# Patient Record
Sex: MALE | Race: WHITE | NOT HISPANIC OR LATINO | Employment: FULL TIME | URBAN - METROPOLITAN AREA
[De-identification: names, ages, dates, MRNs, and addresses within clinical notes are randomized per-mention and may not be internally consistent; named-entity substitution may affect disease eponyms.]

---

## 2019-09-26 ENCOUNTER — OFFICE VISIT (OUTPATIENT)
Dept: FAMILY MEDICINE CLINIC | Facility: CLINIC | Age: 44
End: 2019-09-26

## 2019-09-26 VITALS
WEIGHT: 244.4 LBS | HEART RATE: 90 BPM | DIASTOLIC BLOOD PRESSURE: 82 MMHG | SYSTOLIC BLOOD PRESSURE: 118 MMHG | OXYGEN SATURATION: 98 % | TEMPERATURE: 98 F | BODY MASS INDEX: 33.1 KG/M2 | HEIGHT: 72 IN

## 2019-09-26 DIAGNOSIS — E66.9 OBESITY (BMI 30-39.9): ICD-10-CM

## 2019-09-26 DIAGNOSIS — E03.9 HYPOTHYROIDISM (ACQUIRED): ICD-10-CM

## 2019-09-26 DIAGNOSIS — E29.1 HYPOGONADISM IN MALE: ICD-10-CM

## 2019-09-26 DIAGNOSIS — Z79.899 HIGH RISK MEDICATION USE: Primary | ICD-10-CM

## 2019-09-26 PROCEDURE — 99213 OFFICE O/P EST LOW 20 MIN: CPT | Performed by: FAMILY MEDICINE

## 2019-09-26 RX ORDER — PHENTERMINE HYDROCHLORIDE 37.5 MG/1
1 TABLET ORAL DAILY
Refills: 0 | COMMUNITY
Start: 2019-08-23 | End: 2019-09-26 | Stop reason: SDUPTHER

## 2019-09-26 RX ORDER — LEVOTHYROXINE SODIUM 0.12 MG/1
1 TABLET ORAL DAILY
Refills: 1 | COMMUNITY
Start: 2019-07-19 | End: 2019-09-26 | Stop reason: SDUPTHER

## 2019-09-26 RX ORDER — PHENTERMINE HYDROCHLORIDE 37.5 MG/1
37.5 TABLET ORAL DAILY
Qty: 30 TABLET | Refills: 0 | Status: SHIPPED | OUTPATIENT
Start: 2019-09-26 | End: 2019-11-21 | Stop reason: SDUPTHER

## 2019-09-26 RX ORDER — LEVOTHYROXINE SODIUM 0.12 MG/1
125 TABLET ORAL DAILY
Qty: 90 TABLET | Refills: 1 | Status: SHIPPED | OUTPATIENT
Start: 2019-09-26 | End: 2020-07-17 | Stop reason: SDUPTHER

## 2019-09-26 NOTE — PROGRESS NOTES
"  Chief Complaint   Patient presents with   • Hypothyroidism     Follow up        Subjective   Omkar Faustin is a 44 y.o. male.     History of Present Illness   PT is here for getting reestablished and needs refills and   Hypothyroidism- Stable and some  weight change.  Denies heat or cold intolerance.  No palpitations. He has been out of meds for about 3 weeks.  He feels more lethargic bc he is out of meds.    Hypogonadism- needs to get levels done before we can start.  The following portions of the patient's history were reviewed and updated as appropriate: allergies, current medications, past family history, past medical history, past social history, past surgical history and problem list.    Review of Systems   Constitutional: Negative for fatigue.   Eyes: Negative for blurred vision.   Respiratory: Negative for cough, chest tightness and shortness of breath.    Cardiovascular: Negative for chest pain, palpitations and leg swelling.   Neurological: Negative for dizziness, light-headedness and headache.       There is no problem list on file for this patient.      No Known Allergies      Current Outpatient Medications:   •  levothyroxine (SYNTHROID, LEVOTHROID) 125 MCG tablet, Take 1 tablet by mouth Daily., Disp: 90 tablet, Rfl: 1  •  phentermine (ADIPEX-P) 37.5 MG tablet, Take 1 tablet by mouth Daily., Disp: 30 tablet, Rfl: 0    No past medical history on file.    No past surgical history on file.    No family history on file.    Social History     Tobacco Use   • Smoking status: Never Smoker   • Smokeless tobacco: Never Used   Substance Use Topics   • Alcohol use: Yes     Frequency: Monthly or less            Objective     Visit Vitals  /82   Pulse 90   Temp 98 °F (36.7 °C) (Temporal)   Ht 182.9 cm (72\")   Wt 111 kg (244 lb 6.4 oz)   SpO2 98%   BMI 33.15 kg/m²       Physical Exam   Constitutional: He appears well-developed. No distress.   Eyes: Conjunctivae and lids are normal.   Neck: Trachea normal. " Carotid bruit is not present. No thyroid mass and no thyromegaly present.   Cardiovascular: Normal rate, regular rhythm and normal heart sounds.   Pulmonary/Chest: Effort normal and breath sounds normal.   Lymphadenopathy:     He has no cervical adenopathy.   Neurological: He is alert. He is not disoriented.   Skin: Skin is warm and dry.   Psychiatric: He has a normal mood and affect. His speech is normal and behavior is normal. He is attentive.   Nursing note and vitals reviewed.          Procedures    Assessment/Plan   Problems Addressed this Visit     None      Visit Diagnoses     High risk medication use    -  Primary    Relevant Orders    Compliance Drug Analysis, Ur - Urine, Clean Catch    Hypothyroidism (acquired)        Relevant Medications    levothyroxine (SYNTHROID, LEVOTHROID) 125 MCG tablet    Obesity (BMI 30-39.9)        Relevant Medications    phentermine (ADIPEX-P) 37.5 MG tablet    Hypogonadism in male        Relevant Orders    Testosterone (Free & Total), LC / MS          Orders Placed This Encounter   Procedures   • Compliance Drug Analysis, Ur - Urine, Clean Catch   • Testosterone (Free & Total), LC / MS       Current Outpatient Medications   Medication Sig Dispense Refill   • levothyroxine (SYNTHROID, LEVOTHROID) 125 MCG tablet Take 1 tablet by mouth Daily. 90 tablet 1   • phentermine (ADIPEX-P) 37.5 MG tablet Take 1 tablet by mouth Daily. 30 tablet 0     No current facility-administered medications for this visit.        DIMITRI RUN  and reviewed.  Risks of the medication include but are not limited to fatigue, somnolence, increased risk of falls, constipation, allergic reaction, dependence, and addiction.  Also, emphasized not taking any illicit substances.  Patient is aware and acknowledges information given.     Drug screen done today and Controlled Substance agreement signed.  Refills and encourage compliance.  Will get labs next visit.  No Follow-up on file.    There are no Patient  Instructions on file for this visit.

## 2019-09-30 LAB
TESTOST FREE SERPL-MCNC: 8.2 PG/ML (ref 6.8–21.5)
TESTOST SERPL-MCNC: 321.7 NG/DL (ref 264–916)

## 2019-10-04 LAB — DRUGS UR: NORMAL

## 2019-10-22 DIAGNOSIS — Z23 IMMUNIZATION DUE: Primary | ICD-10-CM

## 2019-10-23 DIAGNOSIS — J40 BRONCHITIS: Primary | ICD-10-CM

## 2019-10-23 RX ORDER — AZITHROMYCIN 250 MG/1
TABLET, FILM COATED ORAL
Qty: 6 TABLET | Refills: 0 | Status: SHIPPED | OUTPATIENT
Start: 2019-10-23 | End: 2019-11-20 | Stop reason: SDUPTHER

## 2019-11-20 DIAGNOSIS — J40 BRONCHITIS: ICD-10-CM

## 2019-11-20 RX ORDER — AZITHROMYCIN 250 MG/1
TABLET, FILM COATED ORAL
Qty: 6 TABLET | Refills: 0 | Status: SHIPPED | OUTPATIENT
Start: 2019-11-20

## 2019-11-21 DIAGNOSIS — E66.9 OBESITY (BMI 30-39.9): ICD-10-CM

## 2019-11-21 RX ORDER — PHENTERMINE HYDROCHLORIDE 37.5 MG/1
37.5 TABLET ORAL DAILY
Qty: 30 TABLET | Refills: 0 | Status: SHIPPED | OUTPATIENT
Start: 2019-11-21 | End: 2020-02-13 | Stop reason: SDUPTHER

## 2019-12-22 RX ORDER — CYCLOBENZAPRINE HCL 10 MG
10 TABLET ORAL 3 TIMES DAILY PRN
Qty: 30 TABLET | Refills: 0 | Status: SHIPPED | OUTPATIENT
Start: 2019-12-22

## 2020-01-02 ENCOUNTER — CLINICAL SUPPORT (OUTPATIENT)
Dept: FAMILY MEDICINE CLINIC | Facility: CLINIC | Age: 45
End: 2020-01-02

## 2020-01-02 DIAGNOSIS — Z23 NEED FOR INFLUENZA VACCINATION: Primary | ICD-10-CM

## 2020-01-02 PROCEDURE — 90715 TDAP VACCINE 7 YRS/> IM: CPT | Performed by: FAMILY MEDICINE

## 2020-01-02 PROCEDURE — 90471 IMMUNIZATION ADMIN: CPT | Performed by: FAMILY MEDICINE

## 2020-01-02 PROCEDURE — 90686 IIV4 VACC NO PRSV 0.5 ML IM: CPT | Performed by: FAMILY MEDICINE

## 2020-01-02 PROCEDURE — 90472 IMMUNIZATION ADMIN EACH ADD: CPT | Performed by: FAMILY MEDICINE

## 2020-02-10 DIAGNOSIS — E66.9 OBESITY (BMI 30-39.9): ICD-10-CM

## 2020-02-10 RX ORDER — PHENTERMINE HYDROCHLORIDE 37.5 MG/1
37.5 TABLET ORAL DAILY
Qty: 30 TABLET | OUTPATIENT
Start: 2020-02-10

## 2020-02-13 ENCOUNTER — OFFICE VISIT (OUTPATIENT)
Dept: FAMILY MEDICINE CLINIC | Facility: CLINIC | Age: 45
End: 2020-02-13

## 2020-02-13 VITALS
WEIGHT: 248 LBS | HEIGHT: 72 IN | OXYGEN SATURATION: 98 % | SYSTOLIC BLOOD PRESSURE: 110 MMHG | BODY MASS INDEX: 33.59 KG/M2 | HEART RATE: 94 BPM | DIASTOLIC BLOOD PRESSURE: 78 MMHG | TEMPERATURE: 97.1 F

## 2020-02-13 DIAGNOSIS — E03.9 HYPOTHYROIDISM (ACQUIRED): Primary | ICD-10-CM

## 2020-02-13 DIAGNOSIS — E66.9 OBESITY (BMI 30-39.9): ICD-10-CM

## 2020-02-13 DIAGNOSIS — E66.3 OVERWEIGHT: ICD-10-CM

## 2020-02-13 PROCEDURE — 99213 OFFICE O/P EST LOW 20 MIN: CPT | Performed by: FAMILY MEDICINE

## 2020-02-13 RX ORDER — PHENTERMINE HYDROCHLORIDE 37.5 MG/1
37.5 TABLET ORAL DAILY
Qty: 30 TABLET | Refills: 0 | Status: SHIPPED | OUTPATIENT
Start: 2020-02-13 | End: 2020-07-16 | Stop reason: SDUPTHER

## 2020-02-13 NOTE — PROGRESS NOTES
Chief Complaint   Patient presents with   • Med Refill     Pt is here to follow up and get a refill on his weight loss medication        Subjective   Omkar Faustin is a 44 y.o. male.     History of Present Illness   PT is here for refills and   Overweight- needs refills and is trying to exercise and diet  Hypothyroidism- Stable and No significant weight change.  Denies heat or cold intolerance.  No palpitations.    The following portions of the patient's history were reviewed and updated as appropriate: allergies, current medications, past family history, past medical history, past social history, past surgical history and problem list.    Review of Systems   Constitutional: Negative for fatigue.   Eyes: Negative for blurred vision.   Respiratory: Negative for cough, chest tightness and shortness of breath.    Cardiovascular: Negative for chest pain, palpitations and leg swelling.   Neurological: Negative for dizziness, light-headedness and headache.       Patient Active Problem List   Diagnosis   • Hypothyroidism (acquired)   • Overweight       No Known Allergies      Current Outpatient Medications:   •  levothyroxine (SYNTHROID, LEVOTHROID) 125 MCG tablet, Take 1 tablet by mouth Daily., Disp: 90 tablet, Rfl: 1  •  phentermine (ADIPEX-P) 37.5 MG tablet, Take 1 tablet by mouth Daily., Disp: 30 tablet, Rfl: 0  •  azithromycin (ZITHROMAX) 250 MG tablet, Take 2 tablets the first day, then 1 tablet daily for 4 days., Disp: 6 tablet, Rfl: 0  •  cyclobenzaprine (FLEXERIL) 10 MG tablet, Take 1 tablet by mouth 3 (Three) Times a Day As Needed for Muscle Spasms., Disp: 30 tablet, Rfl: 0    Past Medical History:   Diagnosis Date   • ADHD    • Eczema    • Hypothyroidism (acquired)    • Overweight        History reviewed. No pertinent surgical history.    History reviewed. No pertinent family history.    Social History     Tobacco Use   • Smoking status: Never Smoker   • Smokeless tobacco: Never Used   Substance Use Topics   •  "Alcohol use: Yes     Frequency: Monthly or less            Objective     Visit Vitals  /78   Pulse 94   Temp 97.1 °F (36.2 °C)   Ht 182.9 cm (72\")   Wt 112 kg (248 lb)   SpO2 98%   BMI 33.63 kg/m²       Physical Exam   Constitutional: He appears well-developed. No distress.   HENT:   Head: Normocephalic.   Right Ear: External ear normal.   Left Ear: External ear normal.   Mouth/Throat: Oropharynx is clear and moist. No oropharyngeal exudate.   Eyes: Conjunctivae and lids are normal. Right eye exhibits no discharge. Left eye exhibits no discharge.   Neck: Trachea normal. Carotid bruit is not present. No tracheal deviation present. No thyroid mass and no thyromegaly present.   Cardiovascular: Normal rate, regular rhythm and normal heart sounds.   Pulmonary/Chest: Effort normal and breath sounds normal. No stridor. No respiratory distress. He has no wheezes. He has no rales.   Lymphadenopathy:     He has no cervical adenopathy.   Neurological: He is alert. He is not disoriented.   Skin: Skin is warm and dry.   Psychiatric: He has a normal mood and affect. His speech is normal and behavior is normal. He is attentive.   Nursing note and vitals reviewed.      No results found for: GLUCOSE, BUN, CREATININE, EGFRIFNONA, EGFRIFAFRI, BCR, K, CO2, CALCIUM, PROTENTOTREF, ALBUMIN, LABIL2, BILIRUBIN, AST, ALT    No results found for: WBC, RBC, HGB, HCT, MCV, MCH, MCHC, RDW, RDWSD, MPV, PLT, NEUTRORELPCT, LYMPHORELPCT, MONORELPCT, EOSRELPCT, BASORELPCT, AUTOIGPER, NEUTROABS, LYMPHSABS, MONOSABS, EOSABS, BASOSABS, AUTOIGNUM, NRBC    No results found for: HGBA1C    No results found for: [id number]    No results found for: TSH    No results found for: CHOL  No results found for: TRIG  No results found for: HDL  No results found for: LDL  No results found for: VLDL  No results found for: LDLHDL      Procedures    Assessment/Plan   Problems Addressed this Visit        Endocrine    Hypothyroidism (acquired) - Primary       Other "    Overweight      Other Visit Diagnoses     Obesity (BMI 30-39.9)        Relevant Medications    phentermine (ADIPEX-P) 37.5 MG tablet          No orders of the defined types were placed in this encounter.      Current Outpatient Medications   Medication Sig Dispense Refill   • levothyroxine (SYNTHROID, LEVOTHROID) 125 MCG tablet Take 1 tablet by mouth Daily. 90 tablet 1   • phentermine (ADIPEX-P) 37.5 MG tablet Take 1 tablet by mouth Daily. 30 tablet 0   • azithromycin (ZITHROMAX) 250 MG tablet Take 2 tablets the first day, then 1 tablet daily for 4 days. 6 tablet 0   • cyclobenzaprine (FLEXERIL) 10 MG tablet Take 1 tablet by mouth 3 (Three) Times a Day As Needed for Muscle Spasms. 30 tablet 0     No current facility-administered medications for this visit.        No follow-ups on file.    There are no Patient Instructions on file for this visit.

## 2020-05-19 DIAGNOSIS — E66.9 OBESITY (BMI 30-39.9): ICD-10-CM

## 2020-05-21 RX ORDER — PHENTERMINE HYDROCHLORIDE 37.5 MG/1
37.5 TABLET ORAL DAILY
Qty: 30 TABLET | OUTPATIENT
Start: 2020-05-21

## 2020-07-16 ENCOUNTER — OFFICE VISIT (OUTPATIENT)
Dept: FAMILY MEDICINE CLINIC | Facility: CLINIC | Age: 45
End: 2020-07-16

## 2020-07-16 VITALS
HEART RATE: 77 BPM | BODY MASS INDEX: 32.72 KG/M2 | HEIGHT: 72 IN | OXYGEN SATURATION: 98 % | TEMPERATURE: 98.2 F | WEIGHT: 241.6 LBS | SYSTOLIC BLOOD PRESSURE: 118 MMHG | DIASTOLIC BLOOD PRESSURE: 82 MMHG

## 2020-07-16 DIAGNOSIS — E03.9 HYPOTHYROIDISM (ACQUIRED): Primary | ICD-10-CM

## 2020-07-16 DIAGNOSIS — E66.9 OBESITY (BMI 30-39.9): ICD-10-CM

## 2020-07-16 DIAGNOSIS — E66.3 OVERWEIGHT: ICD-10-CM

## 2020-07-16 PROCEDURE — 99213 OFFICE O/P EST LOW 20 MIN: CPT | Performed by: FAMILY MEDICINE

## 2020-07-16 RX ORDER — PHENTERMINE HYDROCHLORIDE 37.5 MG/1
37.5 TABLET ORAL DAILY
Qty: 30 TABLET | Refills: 0 | Status: SHIPPED | OUTPATIENT
Start: 2020-07-16 | End: 2021-08-31 | Stop reason: SDUPTHER

## 2020-07-16 NOTE — PROGRESS NOTES
Chief Complaint   Patient presents with   • Hypothyroidism     Pt is here for follow up on thyroid levels    • Med Refill     Pt is needing a refill on weightloss medication and thyroid medication according to levels        Subjective   Omkar Faustin is a 45 y.o. male.     History of Present Illness   PT is here for refills, labs and  Hypothyroidism- Stable and No significant weight change.  Denies heat or cold intolerance.  No palpitations. He has not taken in few weeks.  Obesity- stable and doing well with exercise and diet.  Needs refills.  The following portions of the patient's history were reviewed and updated as appropriate: allergies, current medications, past family history, past medical history, past social history, past surgical history and problem list.    Review of Systems   Constitutional: Negative for fatigue.   Eyes: Negative for blurred vision.   Respiratory: Negative for cough, chest tightness and shortness of breath.    Cardiovascular: Negative for chest pain, palpitations and leg swelling.   Neurological: Negative for dizziness, light-headedness and headache.       Patient Active Problem List   Diagnosis   • Hypothyroidism (acquired)   • Overweight       No Known Allergies      Current Outpatient Medications:   •  levothyroxine (SYNTHROID, LEVOTHROID) 125 MCG tablet, Take 1 tablet by mouth Daily., Disp: 90 tablet, Rfl: 1  •  phentermine (ADIPEX-P) 37.5 MG tablet, Take 1 tablet by mouth Daily., Disp: 30 tablet, Rfl: 0  •  azithromycin (ZITHROMAX) 250 MG tablet, Take 2 tablets the first day, then 1 tablet daily for 4 days., Disp: 6 tablet, Rfl: 0  •  cyclobenzaprine (FLEXERIL) 10 MG tablet, Take 1 tablet by mouth 3 (Three) Times a Day As Needed for Muscle Spasms., Disp: 30 tablet, Rfl: 0    Past Medical History:   Diagnosis Date   • ADHD    • Eczema    • Hypothyroidism (acquired)    • Overweight        History reviewed. No pertinent surgical history.    History reviewed. No pertinent family  "history.    Social History     Tobacco Use   • Smoking status: Never Smoker   • Smokeless tobacco: Never Used   Substance Use Topics   • Alcohol use: Yes     Frequency: Monthly or less            Objective     Visit Vitals  /82   Pulse 77   Temp 98.2 °F (36.8 °C)   Ht 182.9 cm (72\")   Wt 110 kg (241 lb 9.6 oz)   SpO2 98%   BMI 32.77 kg/m²       Physical Exam   Constitutional: He appears well-developed. No distress.   Eyes: Conjunctivae and lids are normal.   Neck: Trachea normal. Carotid bruit is not present. No thyroid mass and no thyromegaly present.   Cardiovascular: Normal rate, regular rhythm and normal heart sounds. Exam reveals no friction rub.   No murmur heard.  Pulmonary/Chest: Effort normal and breath sounds normal. No stridor. No respiratory distress. He has no wheezes.   Lymphadenopathy:     He has no cervical adenopathy.   Neurological: He is alert. He is not disoriented.   Skin: Skin is warm and dry.   Psychiatric: He has a normal mood and affect. His speech is normal and behavior is normal. He is attentive.   Nursing note and vitals reviewed.      No results found for: GLUCOSE, BUN, CREATININE, EGFRIFNONA, EGFRIFAFRI, BCR, K, CO2, CALCIUM, PROTENTOTREF, ALBUMIN, LABIL2, BILIRUBIN, AST, ALT    No results found for: WBC, RBC, HGB, HCT, MCV, MCH, MCHC, RDW, RDWSD, MPV, PLT, NEUTRORELPCT, LYMPHORELPCT, MONORELPCT, EOSRELPCT, BASORELPCT, AUTOIGPER, NEUTROABS, LYMPHSABS, MONOSABS, EOSABS, BASOSABS, AUTOIGNUM, NRBC    No results found for: HGBA1C    No results found for: [id number]    No results found for: TSH    No results found for: CHOL  No results found for: TRIG  No results found for: HDL  No results found for: LDL  No results found for: VLDL  No results found for: LDLHDL      Procedures    Assessment/Plan   Problems Addressed this Visit        Endocrine    Hypothyroidism (acquired) - Primary    Relevant Orders    TSH       Other    Overweight      Other Visit Diagnoses     Obesity (BMI 30-39.9)  "       Relevant Medications    phentermine (ADIPEX-P) 37.5 MG tablet          Orders Placed This Encounter   Procedures   • TSH       Current Outpatient Medications   Medication Sig Dispense Refill   • levothyroxine (SYNTHROID, LEVOTHROID) 125 MCG tablet Take 1 tablet by mouth Daily. 90 tablet 1   • phentermine (ADIPEX-P) 37.5 MG tablet Take 1 tablet by mouth Daily. 30 tablet 0   • azithromycin (ZITHROMAX) 250 MG tablet Take 2 tablets the first day, then 1 tablet daily for 4 days. 6 tablet 0   • cyclobenzaprine (FLEXERIL) 10 MG tablet Take 1 tablet by mouth 3 (Three) Times a Day As Needed for Muscle Spasms. 30 tablet 0     No current facility-administered medications for this visit.      Refills and   DIMITRI RUN  and reviewed.  Risks of the medication include but are not limited to fatigue, somnolence, increased risk of falls, constipation, allergic reaction, dependence, and addiction.  Also, emphasized not taking any illicit substances.  Patient is aware and acknowledges information given.    Refill thyroid medication after we get labs back.  Encourage compliance.  Return in about 3 months (around 10/16/2020) for hypothyroidism.    There are no Patient Instructions on file for this visit.

## 2020-07-17 DIAGNOSIS — E03.9 HYPOTHYROIDISM (ACQUIRED): ICD-10-CM

## 2020-07-17 LAB — TSH SERPL DL<=0.005 MIU/L-ACNC: 13.1 UIU/ML (ref 0.27–4.2)

## 2020-07-17 RX ORDER — LEVOTHYROXINE SODIUM 0.12 MG/1
125 TABLET ORAL DAILY
Qty: 90 TABLET | Refills: 1 | Status: SHIPPED | OUTPATIENT
Start: 2020-07-17 | End: 2021-08-16 | Stop reason: SDUPTHER

## 2020-12-18 ENCOUNTER — TELEPHONE (OUTPATIENT)
Dept: FAMILY MEDICINE CLINIC | Facility: CLINIC | Age: 45
End: 2020-12-18

## 2020-12-18 DIAGNOSIS — R42 DIZZINESS: Primary | ICD-10-CM

## 2020-12-18 RX ORDER — MECLIZINE HYDROCHLORIDE 25 MG/1
25 TABLET ORAL 3 TIMES DAILY PRN
Qty: 30 TABLET | Refills: 0 | Status: SHIPPED | OUTPATIENT
Start: 2020-12-18

## 2020-12-18 NOTE — TELEPHONE ENCOUNTER
Patients wife, Keerthi, states the patient has experienced dizziness since 8pm on 12/17/2020. She would like advice on what to do.    Please call Keerthi at [phone].

## 2020-12-18 NOTE — TELEPHONE ENCOUNTER
PT has been since last night having some dizziness feeling when he goes from laying down to standing position.  No dizziness with sitting or sudden turn of head.  He has no NVD.  He states otherwise he feels fine but did state that he has been drinking less fluids over past 2 days so I advised him to drink more fluids bc may be dehydrated.  I also sent in meclanzine and pt will give me an update in a couple of days.

## 2021-08-05 ENCOUNTER — OFFICE VISIT (OUTPATIENT)
Dept: FAMILY MEDICINE CLINIC | Facility: CLINIC | Age: 46
End: 2021-08-05

## 2021-08-05 VITALS
OXYGEN SATURATION: 97 % | HEART RATE: 82 BPM | TEMPERATURE: 97.8 F | DIASTOLIC BLOOD PRESSURE: 80 MMHG | SYSTOLIC BLOOD PRESSURE: 110 MMHG | BODY MASS INDEX: 34.35 KG/M2 | WEIGHT: 253.6 LBS | HEIGHT: 72 IN

## 2021-08-05 DIAGNOSIS — Z11.59 NEED FOR HEPATITIS C SCREENING TEST: ICD-10-CM

## 2021-08-05 DIAGNOSIS — M79.672 PAIN IN BOTH FEET: ICD-10-CM

## 2021-08-05 DIAGNOSIS — R53.83 OTHER FATIGUE: ICD-10-CM

## 2021-08-05 DIAGNOSIS — M79.671 PAIN IN BOTH FEET: ICD-10-CM

## 2021-08-05 DIAGNOSIS — E03.9 HYPOTHYROIDISM (ACQUIRED): Primary | ICD-10-CM

## 2021-08-05 PROCEDURE — 99214 OFFICE O/P EST MOD 30 MIN: CPT | Performed by: FAMILY MEDICINE

## 2021-08-05 NOTE — PROGRESS NOTES
"Chief Complaint  Pain (both feet for about 3 months ) and Thyroid Problem (labs and testosterone )    Subjective          Omkar Faustin presents to [facility]  History of Present Illness  PT is here for   Hypothyroidism- has been out of med for about a month.  He wants to get labs done bc last doctor told him he may have no thyroid issues.    He does feel fatigued even when on the med.  He is overall active but feels since he took covid vaccine he has felt fatigued and exhausted.  His feet hurt all the time for the past few months.    He easily gets cramps and does drink a lot of fluids  Objective   Vital Signs:   /80 (BP Location: Right arm, Patient Position: Sitting)   Pulse 82   Temp 97.8 °F (36.6 °C)   Ht 182.9 cm (72.01\")   Wt 115 kg (253 lb 9.6 oz)   SpO2 97%   BMI 34.39 kg/m²     Physical Exam  Constitutional:       Appearance: Normal appearance.   Cardiovascular:      Rate and Rhythm: Regular rhythm.      Heart sounds: Normal heart sounds.   Musculoskeletal:      Comments: Normal foot exam   Neurological:      Mental Status: He is alert.        Result Review :                 Assessment and Plan    Diagnoses and all orders for this visit:    1. Hypothyroidism (acquired) (Primary)  -     TSH Rfx On Abnormal To Free T4    2. Other fatigue  -     Comprehensive Metabolic Panel  -     Vitamin B12  -     TSH Rfx On Abnormal To Free T4  -     CBC & Differential  -     Testosterone (Free & Total), LC / MS    3. Need for hepatitis C screening test  -     Hepatitis C Antibody    4. Pain in both feet  -     Ambulatory Referral to Podiatry        Follow Up   No follow-ups on file.  Patient was given instructions and counseling regarding his condition or for health maintenance advice. Please see specific information pulled into the AVS if appropriate.     Will get labs  "

## 2021-08-10 DIAGNOSIS — R79.89 LOW TESTOSTERONE: Primary | ICD-10-CM

## 2021-08-10 LAB
ALBUMIN SERPL-MCNC: 4.6 G/DL (ref 3.5–5.2)
ALBUMIN/GLOB SERPL: 1.6 G/DL
ALP SERPL-CCNC: 67 U/L (ref 39–117)
ALT SERPL-CCNC: 33 U/L (ref 1–41)
AST SERPL-CCNC: 24 U/L (ref 1–40)
BASOPHILS # BLD AUTO: 0.05 10*3/MM3 (ref 0–0.2)
BASOPHILS NFR BLD AUTO: 0.7 % (ref 0–1.5)
BILIRUB SERPL-MCNC: 0.4 MG/DL (ref 0–1.2)
BUN SERPL-MCNC: 16 MG/DL (ref 6–20)
BUN/CREAT SERPL: 13.3 (ref 7–25)
CALCIUM SERPL-MCNC: 9.6 MG/DL (ref 8.6–10.5)
CHLORIDE SERPL-SCNC: 102 MMOL/L (ref 98–107)
CO2 SERPL-SCNC: 24.7 MMOL/L (ref 22–29)
CREAT SERPL-MCNC: 1.2 MG/DL (ref 0.76–1.27)
EOSINOPHIL # BLD AUTO: 0.19 10*3/MM3 (ref 0–0.4)
EOSINOPHIL NFR BLD AUTO: 2.6 % (ref 0.3–6.2)
ERYTHROCYTE [DISTWIDTH] IN BLOOD BY AUTOMATED COUNT: 13.4 % (ref 12.3–15.4)
GLOBULIN SER CALC-MCNC: 2.8 GM/DL
GLUCOSE SERPL-MCNC: 91 MG/DL (ref 65–99)
HCT VFR BLD AUTO: 42.9 % (ref 37.5–51)
HCV AB S/CO SERPL IA: <0.1 S/CO RATIO (ref 0–0.9)
HGB BLD-MCNC: 14.8 G/DL (ref 13–17.7)
IMM GRANULOCYTES # BLD AUTO: 0.04 10*3/MM3 (ref 0–0.05)
IMM GRANULOCYTES NFR BLD AUTO: 0.5 % (ref 0–0.5)
LYMPHOCYTES # BLD AUTO: 2.93 10*3/MM3 (ref 0.7–3.1)
LYMPHOCYTES NFR BLD AUTO: 39.6 % (ref 19.6–45.3)
MCH RBC QN AUTO: 30.5 PG (ref 26.6–33)
MCHC RBC AUTO-ENTMCNC: 34.5 G/DL (ref 31.5–35.7)
MCV RBC AUTO: 88.5 FL (ref 79–97)
MONOCYTES # BLD AUTO: 0.53 10*3/MM3 (ref 0.1–0.9)
MONOCYTES NFR BLD AUTO: 7.2 % (ref 5–12)
NEUTROPHILS # BLD AUTO: 3.66 10*3/MM3 (ref 1.7–7)
NEUTROPHILS NFR BLD AUTO: 49.4 % (ref 42.7–76)
NRBC BLD AUTO-RTO: 0 /100 WBC (ref 0–0.2)
PLATELET # BLD AUTO: 221 10*3/MM3 (ref 140–450)
POTASSIUM SERPL-SCNC: 4 MMOL/L (ref 3.5–5.2)
PROT SERPL-MCNC: 7.4 G/DL (ref 6–8.5)
RBC # BLD AUTO: 4.85 10*6/MM3 (ref 4.14–5.8)
SODIUM SERPL-SCNC: 142 MMOL/L (ref 136–145)
T4 FREE SERPL-MCNC: 0.7 NG/DL (ref 0.93–1.7)
TESTOST FREE SERPL-MCNC: 4.7 PG/ML (ref 6.8–21.5)
TESTOST SERPL-MCNC: 181.7 NG/DL (ref 264–916)
TSH SERPL DL<=0.005 MIU/L-ACNC: 31.3 UIU/ML (ref 0.27–4.2)
VIT B12 SERPL-MCNC: 528 PG/ML (ref 211–946)
WBC # BLD AUTO: 7.4 10*3/MM3 (ref 3.4–10.8)

## 2021-08-16 DIAGNOSIS — E03.9 HYPOTHYROIDISM (ACQUIRED): ICD-10-CM

## 2021-08-16 LAB
TESTOST FREE SERPL-MCNC: 7.5 PG/ML (ref 6.8–21.5)
TESTOST SERPL-MCNC: 305.5 NG/DL (ref 264–916)

## 2021-08-16 RX ORDER — LEVOTHYROXINE SODIUM 0.12 MG/1
125 TABLET ORAL DAILY
Qty: 90 TABLET | Refills: 1 | Status: SHIPPED | OUTPATIENT
Start: 2021-08-16

## 2021-08-18 DIAGNOSIS — R79.89 LOW TESTOSTERONE: Primary | ICD-10-CM

## 2021-08-31 DIAGNOSIS — E66.9 OBESITY (BMI 30-39.9): ICD-10-CM

## 2021-08-31 RX ORDER — PHENTERMINE HYDROCHLORIDE 37.5 MG/1
37.5 TABLET ORAL DAILY
Qty: 30 TABLET | Refills: 0 | Status: SHIPPED | OUTPATIENT
Start: 2021-08-31

## 2021-10-28 DIAGNOSIS — R79.89 LOW TESTOSTERONE: Primary | ICD-10-CM

## 2021-11-26 LAB
TESTOST FREE SERPL-MCNC: 7 PG/ML (ref 6.8–21.5)
TESTOST SERPL-MCNC: 248.6 NG/DL (ref 264–916)

## 2021-11-30 DIAGNOSIS — R79.89 LOW TESTOSTERONE IN MALE: Primary | ICD-10-CM

## 2021-11-30 RX ORDER — TESTOSTERONE CYPIONATE 100 MG/ML
100 INJECTION, SOLUTION INTRAMUSCULAR
Qty: 10 ML | Refills: 0 | Status: SHIPPED | OUTPATIENT
Start: 2021-11-30 | End: 2022-11-10 | Stop reason: SDUPTHER

## 2021-12-09 ENCOUNTER — TRANSCRIBE ORDERS (OUTPATIENT)
Dept: ADMINISTRATIVE | Facility: HOSPITAL | Age: 46
End: 2021-12-09

## 2021-12-09 DIAGNOSIS — U07.1 CLINICAL DIAGNOSIS OF SEVERE ACUTE RESPIRATORY SYNDROME CORONAVIRUS 2 (SARS-COV-2) DISEASE: Primary | ICD-10-CM

## 2021-12-10 ENCOUNTER — APPOINTMENT (OUTPATIENT)
Dept: INFUSION THERAPY | Facility: HOSPITAL | Age: 46
End: 2021-12-10

## 2022-10-27 ENCOUNTER — TELEPHONE (OUTPATIENT)
Dept: SURGERY | Facility: CLINIC | Age: 47
End: 2022-10-27

## 2022-11-10 DIAGNOSIS — E03.9 HYPOTHYROIDISM (ACQUIRED): Primary | ICD-10-CM

## 2022-11-10 DIAGNOSIS — R79.89 LOW TESTOSTERONE IN MALE: ICD-10-CM

## 2022-11-10 RX ORDER — TESTOSTERONE CYPIONATE 100 MG/ML
100 INJECTION, SOLUTION INTRAMUSCULAR
Qty: 10 ML | Refills: 0 | Status: SHIPPED | OUTPATIENT
Start: 2022-11-10 | End: 2022-11-10

## 2023-03-14 DIAGNOSIS — F90.0 ATTENTION DEFICIT HYPERACTIVITY DISORDER (ADHD), PREDOMINANTLY INATTENTIVE TYPE: Primary | ICD-10-CM

## 2023-04-24 ENCOUNTER — AMBULATORY SURGICAL CENTER (AMBULATORY)
Dept: URBAN - METROPOLITAN AREA SURGERY 17 | Facility: SURGERY | Age: 48
End: 2023-04-24
Payer: COMMERCIAL

## 2023-04-24 VITALS
DIASTOLIC BLOOD PRESSURE: 90 MMHG | DIASTOLIC BLOOD PRESSURE: 86 MMHG | HEART RATE: 88 BPM | TEMPERATURE: 97.3 F | SYSTOLIC BLOOD PRESSURE: 130 MMHG | RESPIRATION RATE: 16 BRPM | OXYGEN SATURATION: 91 % | RESPIRATION RATE: 6 BRPM | RESPIRATION RATE: 11 BRPM | SYSTOLIC BLOOD PRESSURE: 116 MMHG | SYSTOLIC BLOOD PRESSURE: 128 MMHG | OXYGEN SATURATION: 92 % | DIASTOLIC BLOOD PRESSURE: 75 MMHG | OXYGEN SATURATION: 98 % | SYSTOLIC BLOOD PRESSURE: 138 MMHG | RESPIRATION RATE: 10 BRPM | HEART RATE: 87 BPM | SYSTOLIC BLOOD PRESSURE: 140 MMHG | SYSTOLIC BLOOD PRESSURE: 120 MMHG | DIASTOLIC BLOOD PRESSURE: 73 MMHG | WEIGHT: 244 LBS | OXYGEN SATURATION: 97 % | TEMPERATURE: 98.4 F | RESPIRATION RATE: 14 BRPM | HEART RATE: 89 BPM | HEART RATE: 93 BPM | DIASTOLIC BLOOD PRESSURE: 72 MMHG | RESPIRATION RATE: 7 BRPM | HEART RATE: 91 BPM | HEART RATE: 79 BPM | SYSTOLIC BLOOD PRESSURE: 139 MMHG | OXYGEN SATURATION: 93 % | TEMPERATURE: 97.9 F

## 2023-04-24 DIAGNOSIS — Z12.11 ENCOUNTER FOR SCREENING FOR MALIGNANT NEOPLASM OF COLON: ICD-10-CM

## 2023-04-24 PROCEDURE — 45378 DIAGNOSTIC COLONOSCOPY: CPT | Mod: 33 | Performed by: INTERNAL MEDICINE

## 2023-05-08 NOTE — PATIENT INSTRUCTIONS
Plan of Care:    Medication changes: Okay to start Strattera/atomoxetine 40 mg capsules take 1 every morning for 1 week and then increase to 2 and continue until follow-up appointments  Monitor for dry mouth, upset stomach, worsening anxiety, worsening sleep, reports an 8 immediately, please read attached handout on new medication  Psychotherapy is not recommended at this time but remains a non-medication option if/when needed in the future.  Patient is agreeable to call the office with any worsening of symptoms or onset of intolerable side effects. Patient is agreeable to call 911 or go to the nearest ER should he/she begin having thoughts of hurting themself or other people.  Have prior records either dropped off or fax to my office to review if/when needed  Please read attached handout on ADHD

## 2023-05-09 ENCOUNTER — OFFICE VISIT (OUTPATIENT)
Dept: BEHAVIORAL HEALTH | Facility: CLINIC | Age: 48
End: 2023-05-09
Payer: COMMERCIAL

## 2023-05-09 VITALS
RESPIRATION RATE: 18 BRPM | SYSTOLIC BLOOD PRESSURE: 120 MMHG | OXYGEN SATURATION: 97 % | DIASTOLIC BLOOD PRESSURE: 62 MMHG | WEIGHT: 245 LBS | HEART RATE: 81 BPM | BODY MASS INDEX: 32.47 KG/M2 | HEIGHT: 73 IN

## 2023-05-09 DIAGNOSIS — F90.0 ADHD, PREDOMINANTLY INATTENTIVE TYPE: Primary | ICD-10-CM

## 2023-05-09 RX ORDER — ATOMOXETINE 40 MG/1
80 CAPSULE ORAL EVERY MORNING
Qty: 60 CAPSULE | Refills: 0 | Status: SHIPPED | OUTPATIENT
Start: 2023-05-09

## 2023-11-20 RX ORDER — AZITHROMYCIN 250 MG/1
TABLET, FILM COATED ORAL
Qty: 6 TABLET | Refills: 0 | Status: SHIPPED | OUTPATIENT
Start: 2023-11-20

## 2023-11-21 RX ORDER — BENZONATATE 200 MG/1
200 CAPSULE ORAL 3 TIMES DAILY PRN
Qty: 30 CAPSULE | Refills: 0 | Status: SHIPPED | OUTPATIENT
Start: 2023-11-21

## 2023-11-21 RX ORDER — PREDNISONE 5 MG/1
TABLET ORAL
Qty: 21 TABLET | Refills: 0 | Status: SHIPPED | OUTPATIENT
Start: 2023-11-21

## 2024-01-18 ENCOUNTER — OFFICE VISIT (OUTPATIENT)
Dept: FAMILY MEDICINE CLINIC | Facility: CLINIC | Age: 49
End: 2024-01-18
Payer: COMMERCIAL

## 2024-01-18 VITALS
HEART RATE: 92 BPM | RESPIRATION RATE: 18 BRPM | HEIGHT: 73 IN | BODY MASS INDEX: 33.83 KG/M2 | WEIGHT: 255.3 LBS | SYSTOLIC BLOOD PRESSURE: 118 MMHG | TEMPERATURE: 98.2 F | DIASTOLIC BLOOD PRESSURE: 76 MMHG | OXYGEN SATURATION: 97 %

## 2024-01-18 DIAGNOSIS — Z79.899 HIGH RISK MEDICATION USE: ICD-10-CM

## 2024-01-18 DIAGNOSIS — E03.9 HYPOTHYROIDISM (ACQUIRED): ICD-10-CM

## 2024-01-18 DIAGNOSIS — F90.0 ADHD, PREDOMINANTLY INATTENTIVE TYPE: Primary | ICD-10-CM

## 2024-01-18 PROCEDURE — 99214 OFFICE O/P EST MOD 30 MIN: CPT | Performed by: FAMILY MEDICINE

## 2024-01-18 RX ORDER — DEXTROAMPHETAMINE SACCHARATE, AMPHETAMINE ASPARTATE, DEXTROAMPHETAMINE SULFATE AND AMPHETAMINE SULFATE 7.5; 7.5; 7.5; 7.5 MG/1; MG/1; MG/1; MG/1
30 TABLET ORAL 2 TIMES DAILY
Qty: 60 TABLET | Refills: 0 | Status: SHIPPED | OUTPATIENT
Start: 2024-01-18

## 2024-01-18 NOTE — PROGRESS NOTES
"Chief Complaint  Hypothyroidism (Follow-up) and ADD (Evaluation )    Subjective        Omkar Faustin presents to [facility]  Hypothyroidism    ADD      Pt presents today for ADHD check up.  He used to be on 30 mg of adderall bid with last doctor due to ADHD.  He has been having trouble again recently focusing.  He wants to restart med.  No CP or H    Hypothyroidism- needs labs and check up on this.    Objective   Vital Signs:  /76 (BP Location: Left arm, Patient Position: Sitting, Cuff Size: Large Adult)   Pulse 92   Temp 98.2 °F (36.8 °C) (Tympanic)   Resp 18   Ht 185.4 cm (72.99\")   Wt 116 kg (255 lb 4.8 oz)   SpO2 97%   BMI 33.69 kg/m²   Estimated body mass index is 33.69 kg/m² as calculated from the following:    Height as of this encounter: 185.4 cm (72.99\").    Weight as of this encounter: 116 kg (255 lb 4.8 oz).               Physical Exam  Vitals and nursing note reviewed.   Constitutional:       Appearance: Normal appearance. He is well-developed.   Neck:      Thyroid: No thyroid mass or thyromegaly.      Trachea: Trachea normal. No tracheal tenderness or tracheal deviation.   Cardiovascular:      Rate and Rhythm: Normal rate and regular rhythm.      Heart sounds: Normal heart sounds. No murmur heard.  Pulmonary:      Effort: Pulmonary effort is normal. No respiratory distress.      Breath sounds: Normal breath sounds. No stridor. No wheezing or rhonchi.   Neurological:      General: No focal deficit present.      Mental Status: He is alert and oriented to person, place, and time. He is not disoriented.   Psychiatric:         Mood and Affect: Mood normal.         Behavior: Behavior normal.        Result Review :                     Assessment and Plan     Diagnoses and all orders for this visit:    1. ADHD, predominantly inattentive type (Primary)  -     amphetamine-dextroamphetamine (Adderall) 30 MG tablet; Take 1 tablet by mouth 2 (Two) Times a Day.  Dispense: " 60 tablet; Refill: 0    2. High risk medication use  -     Compliance Drug Analysis, Ur - Urine, Clean Catch    3. Hypothyroidism (acquired)  -     TSH Rfx On Abnormal To Free T4             Follow Up     Return in about 3 months (around 4/18/2024) for adhd.  Patient was given instructions and counseling regarding his condition or for health maintenance advice. Please see specific information pulled into the AVS if appropriate.         DIMITRI RUN  and reviewed on Epic.  Risks of the medication include but are not limited to fatigue, somnolence, increased risk of falls, constipation, allergic reaction, dependence, and addiction.  Also, emphasized not taking any illicit substances.  Patient is aware and acknowledges information given.  Patient is functioning well with the medication.  Patient denies somnolence or any other side effects with the medication.   Medication refilled.      Will get contract and drug screen done today and start adderall again.  SE discussed.

## 2024-01-26 LAB
DRUGS UR: NORMAL
TSH SERPL DL<=0.005 MIU/L-ACNC: 3.41 UIU/ML (ref 0.45–4.5)

## 2024-03-01 DIAGNOSIS — F90.0 ADHD, PREDOMINANTLY INATTENTIVE TYPE: ICD-10-CM

## 2024-03-01 RX ORDER — DEXTROAMPHETAMINE SACCHARATE, AMPHETAMINE ASPARTATE, DEXTROAMPHETAMINE SULFATE AND AMPHETAMINE SULFATE 7.5; 7.5; 7.5; 7.5 MG/1; MG/1; MG/1; MG/1
30 TABLET ORAL 2 TIMES DAILY
Qty: 60 TABLET | Refills: 0 | Status: SHIPPED | OUTPATIENT
Start: 2024-03-01

## 2024-04-15 DIAGNOSIS — F90.0 ADHD, PREDOMINANTLY INATTENTIVE TYPE: ICD-10-CM

## 2024-04-15 RX ORDER — DEXTROAMPHETAMINE SACCHARATE, AMPHETAMINE ASPARTATE, DEXTROAMPHETAMINE SULFATE AND AMPHETAMINE SULFATE 7.5; 7.5; 7.5; 7.5 MG/1; MG/1; MG/1; MG/1
30 TABLET ORAL 2 TIMES DAILY
Qty: 60 TABLET | Refills: 0 | Status: SHIPPED | OUTPATIENT
Start: 2024-04-15

## 2024-06-27 ENCOUNTER — OFFICE VISIT (OUTPATIENT)
Dept: FAMILY MEDICINE CLINIC | Facility: CLINIC | Age: 49
End: 2024-06-27
Payer: COMMERCIAL

## 2024-06-27 VITALS
HEART RATE: 92 BPM | OXYGEN SATURATION: 97 % | WEIGHT: 264 LBS | SYSTOLIC BLOOD PRESSURE: 144 MMHG | HEIGHT: 73 IN | RESPIRATION RATE: 18 BRPM | BODY MASS INDEX: 34.99 KG/M2 | DIASTOLIC BLOOD PRESSURE: 88 MMHG | TEMPERATURE: 98.6 F

## 2024-06-27 DIAGNOSIS — E78.1 HYPERTRIGLYCERIDEMIA: ICD-10-CM

## 2024-06-27 DIAGNOSIS — B35.3 TINEA PEDIS OF BOTH FEET: ICD-10-CM

## 2024-06-27 DIAGNOSIS — F90.0 ADHD, PREDOMINANTLY INATTENTIVE TYPE: Primary | ICD-10-CM

## 2024-06-27 DIAGNOSIS — Z82.49 FAMILY HISTORY OF HEART DISEASE: ICD-10-CM

## 2024-06-27 DIAGNOSIS — E03.9 HYPOTHYROIDISM (ACQUIRED): ICD-10-CM

## 2024-06-27 PROCEDURE — 99214 OFFICE O/P EST MOD 30 MIN: CPT | Performed by: FAMILY MEDICINE

## 2024-06-27 RX ORDER — DEXTROAMPHETAMINE SACCHARATE, AMPHETAMINE ASPARTATE, DEXTROAMPHETAMINE SULFATE AND AMPHETAMINE SULFATE 7.5; 7.5; 7.5; 7.5 MG/1; MG/1; MG/1; MG/1
30 TABLET ORAL 2 TIMES DAILY
Qty: 60 TABLET | Refills: 0 | Status: SHIPPED | OUTPATIENT
Start: 2024-06-27

## 2024-06-27 RX ORDER — KETOCONAZOLE 20 MG/G
1 CREAM TOPICAL DAILY
Qty: 30 G | Refills: 1 | Status: SHIPPED | OUTPATIENT
Start: 2024-06-27

## 2024-06-27 NOTE — PROGRESS NOTES
"Chief Complaint  ADD, Hypothyroidism, and Fatigue (Pt states he feels tired and sluggish lately. And would like some tests ran.Pt's has family hx of heart disease and is concerned he could have blocked arteries or somethinglike that going on )    Subjective        Omkar Faustin presents to [facility]  ADD  Associated symptoms include fatigue.   Hypothyroidism  Associated symptoms include fatigue.   Fatigue  Associated symptoms include fatigue.     Patient is here for ADHD checkup needing refills has no chest pains, no palpitations no change in appetite.  And patient is staying focused with medication.  Hypothyroidism- he gets thru opitmize you.    Hypertriglyceridemia- needs labs.  No med for this.    Pt has a family history of heart disease.  He wants to know about his heart health  Feet are itching him.    Objective   Vital Signs:  /88 (BP Location: Left arm, Patient Position: Sitting, Cuff Size: Adult)   Pulse 92   Temp 98.6 °F (37 °C) (Tympanic)   Resp 18   Ht 185.4 cm (72.99\")   Wt 120 kg (264 lb)   SpO2 97%   BMI 34.84 kg/m²   Estimated body mass index is 34.84 kg/m² as calculated from the following:    Height as of this encounter: 185.4 cm (72.99\").    Weight as of this encounter: 120 kg (264 lb).               Physical Exam  Vitals and nursing note reviewed.   Constitutional:       Appearance: Normal appearance. He is well-developed.   Cardiovascular:      Rate and Rhythm: Normal rate and regular rhythm.      Heart sounds: Normal heart sounds. No murmur heard.  Pulmonary:      Effort: Pulmonary effort is normal. No respiratory distress.      Breath sounds: Normal breath sounds. No stridor. No wheezing or rhonchi.   Skin:     Comments: Tenia pedis.    Neurological:      General: No focal deficit present.      Mental Status: He is alert and oriented to person, place, and time. He is not disoriented.   Psychiatric:         Mood and Affect: Mood normal.         " Behavior: Behavior normal.        Result Review :                     Assessment and Plan     Diagnoses and all orders for this visit:    1. ADHD, predominantly inattentive type (Primary)  -     amphetamine-dextroamphetamine (Adderall) 30 MG tablet; Take 1 tablet by mouth 2 (Two) Times a Day.  Dispense: 60 tablet; Refill: 0    2. Hypothyroidism (acquired)    3. Hypertriglyceridemia  -     Cancel: Lipid Panel  -     Cancel: Comprehensive Metabolic Panel  -     Comprehensive Metabolic Panel  -     Lipid Panel    4. Family history of heart disease  -     Ambulatory Referral to Cardiology    5. Tinea pedis of both feet  -     ketoconazole (NIZORAL) 2 % cream; Apply 1 Application topically to the appropriate area as directed Daily.  Dispense: 30 g; Refill: 1             Follow Up     Return in about 3 months (around 9/27/2024) for adhd.  Patient was given instructions and counseling regarding his condition or for health maintenance advice. Please see specific information pulled into the AVS if appropriate.     Refill, labs and work on diet and exercise.    Start cream for feet.

## 2024-06-28 LAB
ALBUMIN SERPL-MCNC: 4.5 G/DL (ref 3.5–5.2)
ALBUMIN/GLOB SERPL: 1.6 G/DL
ALP SERPL-CCNC: 62 U/L (ref 39–117)
ALT SERPL-CCNC: 27 U/L (ref 1–41)
AST SERPL-CCNC: 19 U/L (ref 1–40)
BILIRUB SERPL-MCNC: 0.6 MG/DL (ref 0–1.2)
BUN SERPL-MCNC: 10 MG/DL (ref 6–20)
BUN/CREAT SERPL: 8 (ref 7–25)
CALCIUM SERPL-MCNC: 9.6 MG/DL (ref 8.6–10.5)
CHLORIDE SERPL-SCNC: 104 MMOL/L (ref 98–107)
CHOLEST SERPL-MCNC: 152 MG/DL (ref 0–200)
CO2 SERPL-SCNC: 25.2 MMOL/L (ref 22–29)
CREAT SERPL-MCNC: 1.25 MG/DL (ref 0.76–1.27)
EGFRCR SERPLBLD CKD-EPI 2021: 69.7 ML/MIN/1.73
GLOBULIN SER CALC-MCNC: 2.9 GM/DL
GLUCOSE SERPL-MCNC: 122 MG/DL (ref 65–99)
HDLC SERPL-MCNC: 31 MG/DL (ref 40–60)
LDLC SERPL CALC-MCNC: 76 MG/DL (ref 0–100)
POTASSIUM SERPL-SCNC: 4 MMOL/L (ref 3.5–5.2)
PROT SERPL-MCNC: 7.4 G/DL (ref 6–8.5)
SODIUM SERPL-SCNC: 139 MMOL/L (ref 136–145)
TRIGL SERPL-MCNC: 274 MG/DL (ref 0–150)
VLDLC SERPL CALC-MCNC: 45 MG/DL (ref 5–40)

## 2024-09-03 ENCOUNTER — OFFICE VISIT (OUTPATIENT)
Dept: CARDIOLOGY | Facility: CLINIC | Age: 49
End: 2024-09-03
Payer: COMMERCIAL

## 2024-09-03 VITALS
HEIGHT: 73 IN | DIASTOLIC BLOOD PRESSURE: 86 MMHG | OXYGEN SATURATION: 98 % | HEART RATE: 85 BPM | SYSTOLIC BLOOD PRESSURE: 122 MMHG | WEIGHT: 249 LBS | BODY MASS INDEX: 33 KG/M2

## 2024-09-03 DIAGNOSIS — E78.1 HYPERTRIGLYCERIDEMIA: Primary | ICD-10-CM

## 2024-09-03 DIAGNOSIS — Z82.49 FAMILY HISTORY OF HEART DISEASE: ICD-10-CM

## 2024-09-03 DIAGNOSIS — R06.09 DYSPNEA ON EXERTION: ICD-10-CM

## 2024-09-03 DIAGNOSIS — R53.83 OTHER FATIGUE: ICD-10-CM

## 2024-09-03 PROCEDURE — 93000 ELECTROCARDIOGRAM COMPLETE: CPT | Performed by: STUDENT IN AN ORGANIZED HEALTH CARE EDUCATION/TRAINING PROGRAM

## 2024-09-03 PROCEDURE — 99204 OFFICE O/P NEW MOD 45 MIN: CPT | Performed by: STUDENT IN AN ORGANIZED HEALTH CARE EDUCATION/TRAINING PROGRAM

## 2024-09-03 NOTE — PROGRESS NOTES
[facility]    Subjective:     Encounter Date:09/03/24      Patient ID: Omkar Faustin is a 49 y.o. male.    Chief Complaint:   Chief Complaint   Patient presents with    Establish Care     Patient is in the office today to establish care for family history of heart disease.       History of Present Illness    Omkar Faustin is a pleasant 49-year-old gentleman past medical history of hypothyroidism, hypertriglyceridemia, family history of coronary heart disease, who presents for further evaluation.  He has had some nonspecific fatigue symptoms over the last few months andPresents today for a cardiac evaluation,.    He reports that he has had some mild dyspnea on exertion, but otherwise no significant chest pain.  He just reports that he if he has lower energy than he has in the past.  He has had a history of hypertriglyceridemia but otherwise has been diet controlled.  He does routine weightlifting but does not do a lot of aerobic activities or exercise.    Previous Cardiac Testing:  None    The following portions of the patient's history were reviewed and updated as appropriate: allergies, current medications, past family history, past medical history, past social history, past surgical history and problem list.    Past Medical History:   Diagnosis Date    ADHD     Eczema     Hypothyroidism (acquired)     Overweight        History reviewed. No pertinent surgical history.        ECG 12 Lead    Date/Time: 9/3/2024 12:17 PM  Performed by: Michel Ernst MD    Authorized by: Michel Ernst MD  Comparison: not compared with previous ECG   Previous ECG: no previous ECG available  Rhythm: sinus rhythm  Rate: normal  Conduction: conduction normal  ST Segments: ST segments normal  T Waves: T waves normal  QRS axis: normal  Other: no other findings    Clinical impression: normal ECG             Objective:     Vitals:    09/03/24 1144   BP: 122/86   BP Location: Left arm   Patient Position: Sitting  "  Pulse: 85   SpO2: 98%   Weight: 113 kg (249 lb)   Height: 185.4 cm (72.99\")         Constitutional:       Appearance: Healthy appearance. Not in distress.   Neck:      Vascular: JVD normal.   Pulmonary:      Effort: Pulmonary effort is normal.      Breath sounds: Normal breath sounds.   Cardiovascular:      PMI at left midclavicular line. Normal rate. Regular rhythm. Normal S2.       Murmurs: There is no murmur.   Pulses:     Intact distal pulses.   Edema:     Peripheral edema absent.   Skin:     General: Skin is warm and dry.   Neurological:      General: No focal deficit present.      Mental Status: Alert, oriented to person, place, and time and oriented to person, place and time.   Psychiatric:         Mood and Affect: Mood and affect normal.         Lab Review:     Lipid Panel          6/27/2024    15:08   Lipid Panel   Total Cholesterol 152    Triglycerides 274    HDL Cholesterol 31    VLDL Cholesterol 45    LDL Cholesterol  76      BUN   Date Value Ref Range Status   06/27/2024 10 6 - 20 mg/dL Final   01/26/2018 21 9 - 21 mg/dL Final     Creatinine   Date Value Ref Range Status   06/27/2024 1.25 0.76 - 1.27 mg/dL Final   01/26/2018 1.37 (H) 0.70 - 1.30 mg/dL Final     Potassium   Date Value Ref Range Status   06/27/2024 4.0 3.5 - 5.2 mmol/L Final   01/26/2018 4.1 3.5 - 5.1 mmol/L Final     ALT (SGPT)   Date Value Ref Range Status   06/27/2024 27 1 - 41 U/L Final   01/26/2018 19 0 - 55 U/L Final     AST (SGOT)   Date Value Ref Range Status   06/27/2024 19 1 - 40 U/L Final   01/26/2018 17 5 - 34 U/L Final         Performed        Assessment:          Diagnosis Plan   1. Hypertriglyceridemia  Adult Transthoracic Echo Complete w/ Color, Spectral and Contrast if necessary per protocol    Treadmill Stress Test    CT Cardiac Calcium Score Without Dye    Vascular Screening (Bundle) CAR      2. Family history of heart disease  Adult Transthoracic Echo Complete w/ Color, Spectral and Contrast if necessary per protocol "    Treadmill Stress Test    CT Cardiac Calcium Score Without Dye    Vascular Screening (Bundle) CAR      3. Dyspnea on exertion  Adult Transthoracic Echo Complete w/ Color, Spectral and Contrast if necessary per protocol    Treadmill Stress Test      4. Other fatigue  Adult Transthoracic Echo Complete w/ Color, Spectral and Contrast if necessary per protocol    Treadmill Stress Test             Plan:         Fatigue/dyspnea on exertion: Could represent anginal equivalent with his risk factors.  He has a family history of coronary heart disease.  Given his predictable dyspnea with exertion, improving with rest, will arrange for treadmill stress test  Check echocardiogram to rule out structural abnormalities  Family history of coronary heart disease, hypertriglyceridemia: We discussed healthy diet and lifestyle modifications.  He is going to try more cardio aerobic activities.  We also discussed the rationale of screening exams and he is going to obtain vascular screens at [facility], this can perhaps tailor his cholesterol regimen since he is not currently requiring any therapy.     Thank you for allowing me to participate in the care of Omkar Faustin. Feel free to contact me directly with any further questions or concerns.    RTC as needed after the above testing.  We will touch base after his vascular screens and treadmill and echo are obtained    Michel Ernst MD  [facility]  09/03/24  11:40 EDT       Current Outpatient Medications:     amphetamine-dextroamphetamine (Adderall) 30 MG tablet, Take 1 tablet by mouth 2 (Two) Times a Day., Disp: 60 tablet, Rfl: 0    ketoconazole (NIZORAL) 2 % cream, Apply 1 Application topically to the appropriate area as directed Daily., Disp: 30 g, Rfl: 1    levothyroxine (SYNTHROID, LEVOTHROID) 125 MCG tablet, Take 1 tablet by mouth Daily., Disp: 90 tablet, Rfl: 1         Return if symptoms worsen or fail to improve.      Part of this note may be an  electronic transcription/translation of spoken language to printed text using the Dragon Dictation System.

## 2024-09-10 DIAGNOSIS — F90.0 ADHD, PREDOMINANTLY INATTENTIVE TYPE: ICD-10-CM

## 2024-09-10 RX ORDER — DEXTROAMPHETAMINE SACCHARATE, AMPHETAMINE ASPARTATE, DEXTROAMPHETAMINE SULFATE AND AMPHETAMINE SULFATE 7.5; 7.5; 7.5; 7.5 MG/1; MG/1; MG/1; MG/1
30 TABLET ORAL 2 TIMES DAILY
Qty: 60 TABLET | Refills: 0 | Status: SHIPPED | OUTPATIENT
Start: 2024-09-10

## 2024-09-13 ENCOUNTER — HOSPITAL ENCOUNTER (OUTPATIENT)
Dept: CARDIOLOGY | Facility: HOSPITAL | Age: 49
Discharge: HOME | End: 2024-09-13
Payer: COMMERCIAL

## 2024-09-13 ENCOUNTER — TELEPHONE (OUTPATIENT)
Dept: CARDIOLOGY | Facility: CLINIC | Age: 49
End: 2024-09-13
Payer: COMMERCIAL

## 2024-09-13 VITALS
DIASTOLIC BLOOD PRESSURE: 74 MMHG | BODY MASS INDEX: 33.72 KG/M2 | HEART RATE: 72 BPM | WEIGHT: 249 LBS | HEIGHT: 72 IN | SYSTOLIC BLOOD PRESSURE: 126 MMHG

## 2024-09-13 DIAGNOSIS — R53.83 OTHER FATIGUE: ICD-10-CM

## 2024-09-13 DIAGNOSIS — R06.09 DYSPNEA ON EXERTION: ICD-10-CM

## 2024-09-13 DIAGNOSIS — Z82.49 FAMILY HISTORY OF HEART DISEASE: ICD-10-CM

## 2024-09-13 DIAGNOSIS — E78.1 HYPERTRIGLYCERIDEMIA: ICD-10-CM

## 2024-09-13 LAB
AORTIC DIMENSIONLESS INDEX: 0.9 (DI)
BH CV ECHO MEAS - AO MAX PG: 5.3 MMHG
BH CV ECHO MEAS - AO MEAN PG: 3 MMHG
BH CV ECHO MEAS - AO ROOT DIAM: 3.4 CM
BH CV ECHO MEAS - AO V2 MAX: 115 CM/SEC
BH CV ECHO MEAS - AO V2 VTI: 23.3 CM
BH CV ECHO MEAS - AVA(I,D): 2.8 CM2
BH CV ECHO MEAS - EDV(CUBED): 103.8 ML
BH CV ECHO MEAS - EDV(MOD-SP2): 109 ML
BH CV ECHO MEAS - EDV(MOD-SP4): 111 ML
BH CV ECHO MEAS - EF(MOD-BP): 59.1 %
BH CV ECHO MEAS - EF(MOD-SP2): 62.4 %
BH CV ECHO MEAS - EF(MOD-SP4): 57.7 %
BH CV ECHO MEAS - ESV(CUBED): 28.9 ML
BH CV ECHO MEAS - ESV(MOD-SP2): 41 ML
BH CV ECHO MEAS - ESV(MOD-SP4): 47 ML
BH CV ECHO MEAS - FS: 34.7 %
BH CV ECHO MEAS - IVS/LVPW: 0.9 CM
BH CV ECHO MEAS - IVSD: 0.9 CM
BH CV ECHO MEAS - LAT PEAK E' VEL: 10.1 CM/SEC
BH CV ECHO MEAS - LV DIASTOLIC VOL/BSA (35-75): 47.5 CM2
BH CV ECHO MEAS - LV MASS(C)D: 153.4 GRAMS
BH CV ECHO MEAS - LV MAX PG: 4.8 MMHG
BH CV ECHO MEAS - LV MEAN PG: 2 MMHG
BH CV ECHO MEAS - LV SYSTOLIC VOL/BSA (12-30): 20.1 CM2
BH CV ECHO MEAS - LV V1 MAX: 109 CM/SEC
BH CV ECHO MEAS - LV V1 VTI: 21.2 CM
BH CV ECHO MEAS - LVIDD: 4.7 CM
BH CV ECHO MEAS - LVIDS: 3.1 CM
BH CV ECHO MEAS - LVOT AREA: 3.1 CM2
BH CV ECHO MEAS - LVOT DIAM: 1.97 CM
BH CV ECHO MEAS - LVPWD: 1 CM
BH CV ECHO MEAS - MED PEAK E' VEL: 9.1 CM/SEC
BH CV ECHO MEAS - MR MAX PG: 9.5 MMHG
BH CV ECHO MEAS - MR MAX VEL: 154 CM/SEC
BH CV ECHO MEAS - MV A DUR: 0.1 SEC
BH CV ECHO MEAS - MV A MAX VEL: 63.4 CM/SEC
BH CV ECHO MEAS - MV DEC SLOPE: 252.3 CM/SEC2
BH CV ECHO MEAS - MV DEC TIME: 246 SEC
BH CV ECHO MEAS - MV E MAX VEL: 71.3 CM/SEC
BH CV ECHO MEAS - MV E/A: 1.12
BH CV ECHO MEAS - MV MAX PG: 2.46 MMHG
BH CV ECHO MEAS - MV MEAN PG: 1.13 MMHG
BH CV ECHO MEAS - MV P1/2T: 84.4 MSEC
BH CV ECHO MEAS - MV V2 VTI: 29.6 CM
BH CV ECHO MEAS - MVA(P1/2T): 2.6 CM2
BH CV ECHO MEAS - MVA(VTI): 2.19 CM2
BH CV ECHO MEAS - PA ACC TIME: 0.1 SEC
BH CV ECHO MEAS - PA V2 MAX: 108.8 CM/SEC
BH CV ECHO MEAS - PULM A REVS DUR: 0.09 SEC
BH CV ECHO MEAS - PULM A REVS VEL: 29.8 CM/SEC
BH CV ECHO MEAS - PULM DIAS VEL: 30.3 CM/SEC
BH CV ECHO MEAS - PULM S/D: 1.5
BH CV ECHO MEAS - PULM SYS VEL: 45.5 CM/SEC
BH CV ECHO MEAS - RAP SYSTOLE: 3 MMHG
BH CV ECHO MEAS - RV MAX PG: 1.56 MMHG
BH CV ECHO MEAS - RV V1 MAX: 62.4 CM/SEC
BH CV ECHO MEAS - RV V1 VTI: 12.1 CM
BH CV ECHO MEAS - RVSP: 14.3 MMHG
BH CV ECHO MEAS - SV(LVOT): 64.8 ML
BH CV ECHO MEAS - SV(MOD-SP2): 68 ML
BH CV ECHO MEAS - SV(MOD-SP4): 64 ML
BH CV ECHO MEAS - SVI(LVOT): 27.7 ML/M2
BH CV ECHO MEAS - SVI(MOD-SP2): 29.1 ML/M2
BH CV ECHO MEAS - SVI(MOD-SP4): 27.4 ML/M2
BH CV ECHO MEAS - TAPSE (>1.6): 1.92 CM
BH CV ECHO MEAS - TR MAX PG: 11.3 MMHG
BH CV ECHO MEAS - TR MAX VEL: 168.3 CM/SEC
BH CV ECHO MEASUREMENTS AVERAGE E/E' RATIO: 7.43
BH CV STRESS BP STAGE 1: NORMAL
BH CV STRESS BP STAGE 2: NORMAL
BH CV STRESS BP STAGE 3: NORMAL
BH CV STRESS BP STAGE 4: NORMAL
BH CV STRESS DURATION MIN STAGE 1: 3
BH CV STRESS DURATION MIN STAGE 2: 3
BH CV STRESS DURATION MIN STAGE 3: 3
BH CV STRESS DURATION MIN STAGE 4: 3
BH CV STRESS DURATION SEC STAGE 1: 0
BH CV STRESS DURATION SEC STAGE 2: 0
BH CV STRESS DURATION SEC STAGE 3: 0
BH CV STRESS DURATION SEC STAGE 4: 0
BH CV STRESS GRADE STAGE 1: 10
BH CV STRESS GRADE STAGE 2: 12
BH CV STRESS GRADE STAGE 3: 14
BH CV STRESS GRADE STAGE 4: 16
BH CV STRESS HR STAGE 1: 100
BH CV STRESS HR STAGE 2: 113
BH CV STRESS HR STAGE 3: 125
BH CV STRESS HR STAGE 4: 150
BH CV STRESS METS STAGE 1: 4.6
BH CV STRESS METS STAGE 2: 7.1
BH CV STRESS METS STAGE 3: 10.2
BH CV STRESS METS STAGE 4: 13.5
BH CV STRESS PROTOCOL 1: NORMAL
BH CV STRESS RECOVERY BP: NORMAL MMHG
BH CV STRESS RECOVERY HR: 105 BPM
BH CV STRESS SPEED STAGE 1: 1.7
BH CV STRESS SPEED STAGE 2: 2.5
BH CV STRESS SPEED STAGE 3: 3.4
BH CV STRESS SPEED STAGE 4: 4.2
BH CV STRESS STAGE 1: 1
BH CV STRESS STAGE 2: 2
BH CV STRESS STAGE 3: 3
BH CV STRESS STAGE 4: 4
BH CV XLRA - RV BASE: 3.5 CM
BH CV XLRA - RV LENGTH: 7.9 CM
BH CV XLRA - RV MID: 2.5 CM
BH CV XLRA - TDI S': 9.8 CM/SEC
LEFT ATRIUM VOLUME INDEX: 13.8 ML/M2
MAXIMAL PREDICTED HEART RATE: 169 BPM
PERCENT MAX PREDICTED HR: 88.76 %
STRESS BASELINE BP: NORMAL MMHG
STRESS BASELINE HR: 75 BPM
STRESS O2 SAT REST: 98 %
STRESS PERCENT HR: 104 %
STRESS POST ESTIMATED WORKLOAD: 13.5 METS
STRESS POST EXERCISE DUR MIN: 12 MIN
STRESS POST EXERCISE DUR SEC: 0 SEC
STRESS POST O2 SAT PEAK: 98 %
STRESS POST PEAK BP: NORMAL MMHG
STRESS POST PEAK HR: 150 BPM
STRESS TARGET HR: 144 BPM

## 2024-09-13 PROCEDURE — 93017 CV STRESS TEST TRACING ONLY: CPT

## 2024-09-13 PROCEDURE — 93306 TTE W/DOPPLER COMPLETE: CPT

## 2024-09-13 NOTE — PROGRESS NOTES
Please call patient and let him know the good news that his stress test and echo are both normal.  This makes it unlikely that there are any significant problems in his heart that would make him feel short of breath.  His exercise capacity is good and there is no evidence on his stress test that there are any significantly blocked heart vessels that would make him have symptoms.  I do think it may be worthwhile to undergo the vascular screening test at [facility] it does not appear that those are scheduled yet at least from what I can tell, I do believe he received a handout to go over how to schedule that.  This could help us determine how aggressively we need to treat his cholesterol.  Thank you very much

## 2024-12-06 DIAGNOSIS — F90.0 ADHD, PREDOMINANTLY INATTENTIVE TYPE: ICD-10-CM

## 2024-12-06 RX ORDER — DEXTROAMPHETAMINE SACCHARATE, AMPHETAMINE ASPARTATE, DEXTROAMPHETAMINE SULFATE AND AMPHETAMINE SULFATE 7.5; 7.5; 7.5; 7.5 MG/1; MG/1; MG/1; MG/1
30 TABLET ORAL 2 TIMES DAILY
Qty: 60 TABLET | Refills: 0 | Status: SHIPPED | OUTPATIENT
Start: 2024-12-06

## 2025-01-17 DIAGNOSIS — F90.0 ADHD, PREDOMINANTLY INATTENTIVE TYPE: ICD-10-CM

## 2025-01-17 RX ORDER — DEXTROAMPHETAMINE SACCHARATE, AMPHETAMINE ASPARTATE, DEXTROAMPHETAMINE SULFATE AND AMPHETAMINE SULFATE 7.5; 7.5; 7.5; 7.5 MG/1; MG/1; MG/1; MG/1
30 TABLET ORAL 2 TIMES DAILY
Qty: 60 TABLET | Refills: 0 | Status: SHIPPED | OUTPATIENT
Start: 2025-01-17

## 2025-03-10 ENCOUNTER — OFFICE VISIT (OUTPATIENT)
Dept: FAMILY MEDICINE CLINIC | Facility: CLINIC | Age: 50
End: 2025-03-10
Payer: MEDICAID

## 2025-03-10 VITALS
DIASTOLIC BLOOD PRESSURE: 82 MMHG | HEIGHT: 72 IN | SYSTOLIC BLOOD PRESSURE: 126 MMHG | RESPIRATION RATE: 16 BRPM | BODY MASS INDEX: 34.51 KG/M2 | WEIGHT: 254.8 LBS | TEMPERATURE: 98 F | OXYGEN SATURATION: 98 % | HEART RATE: 78 BPM

## 2025-03-10 DIAGNOSIS — F90.0 ADHD, PREDOMINANTLY INATTENTIVE TYPE: ICD-10-CM

## 2025-03-10 DIAGNOSIS — R53.83 OTHER FATIGUE: Primary | ICD-10-CM

## 2025-03-10 PROCEDURE — 99214 OFFICE O/P EST MOD 30 MIN: CPT | Performed by: FAMILY MEDICINE

## 2025-03-10 RX ORDER — DEXTROAMPHETAMINE SACCHARATE, AMPHETAMINE ASPARTATE, DEXTROAMPHETAMINE SULFATE AND AMPHETAMINE SULFATE 7.5; 7.5; 7.5; 7.5 MG/1; MG/1; MG/1; MG/1
30 TABLET ORAL 2 TIMES DAILY
Qty: 60 TABLET | Refills: 0 | Status: SHIPPED | OUTPATIENT
Start: 2025-03-10

## 2025-03-10 NOTE — PROGRESS NOTES
"Chief Complaint  ADHD    Subjective        Omkar Faustin presents to [facility]  History of Present Illness  Patient is here for ADHD checkup needing refills has no chest pains, no palpitations no change in appetite.  And patient is staying focused with medication.  He does not use every day.    He has been feeling very tired recently.  He is still regularly exercising.    He has also put on wt recently and is concerned.    Objective   Vital Signs:  /82 (BP Location: Left arm, Patient Position: Sitting)   Pulse 78   Temp 98 °F (36.7 °C)   Resp 16   Ht 182.9 cm (72\")   Wt 116 kg (254 lb 12.8 oz)   SpO2 98%   BMI 34.56 kg/m²   Estimated body mass index is 34.56 kg/m² as calculated from the following:    Height as of this encounter: 182.9 cm (72\").    Weight as of this encounter: 116 kg (254 lb 12.8 oz).          Physical Exam  Vitals and nursing note reviewed.   Constitutional:       Appearance: Normal appearance. He is well-developed.   Neck:      Thyroid: No thyroid mass or thyromegaly.      Trachea: Trachea normal. No tracheal tenderness or tracheal deviation.   Cardiovascular:      Rate and Rhythm: Normal rate and regular rhythm.      Heart sounds: Normal heart sounds. No murmur heard.  Pulmonary:      Effort: Pulmonary effort is normal. No respiratory distress.      Breath sounds: Normal breath sounds. No stridor. No wheezing or rhonchi.   Neurological:      General: No focal deficit present.      Mental Status: He is alert and oriented to person, place, and time. He is not disoriented.   Psychiatric:         Mood and Affect: Mood normal.         Behavior: Behavior normal.        Result Review :                Assessment and Plan   Diagnoses and all orders for this visit:    1. Other fatigue (Primary)  -     TSH Rfx On Abnormal To Free T4  -     Vitamin B12  -     CBC & Differential  -     Testosterone    2. ADHD, predominantly inattentive type  -     " amphetamine-dextroamphetamine (Adderall) 30 MG tablet; Take 1 tablet by mouth 2 (Two) Times a Day.  Dispense: 60 tablet; Refill: 0             Follow Up   No follow-ups on file.  Patient was given instructions and counseling regarding his condition or for health maintenance advice. Please see specific information pulled into the AVS if appropriate.       Refills, labs and   DIMITRI RUN  and reviewed on Epic.  Risks of the medication include but are not limited to fatigue, somnolence, increased risk of falls, constipation, allergic reaction, dependence, and addiction.  Also, emphasized not taking any illicit substances.  Patient is aware and acknowledges information given.  Patient is functioning well with the medication.  Patient denies somnolence or any other side effects with the medication.   Medication refilled.

## 2025-03-21 LAB
BASOPHILS # BLD AUTO: 0.04 10*3/MM3 (ref 0–0.2)
BASOPHILS NFR BLD AUTO: 0.6 % (ref 0–1.5)
EOSINOPHIL # BLD AUTO: 0.21 10*3/MM3 (ref 0–0.4)
EOSINOPHIL NFR BLD AUTO: 3.3 % (ref 0.3–6.2)
ERYTHROCYTE [DISTWIDTH] IN BLOOD BY AUTOMATED COUNT: 12.3 % (ref 12.3–15.4)
HCT VFR BLD AUTO: 49.4 % (ref 37.5–51)
HGB BLD-MCNC: 17.2 G/DL (ref 13–17.7)
IMM GRANULOCYTES # BLD AUTO: 0.03 10*3/MM3 (ref 0–0.05)
IMM GRANULOCYTES NFR BLD AUTO: 0.5 % (ref 0–0.5)
LYMPHOCYTES # BLD AUTO: 2.45 10*3/MM3 (ref 0.7–3.1)
LYMPHOCYTES NFR BLD AUTO: 38 % (ref 19.6–45.3)
MCH RBC QN AUTO: 31.1 PG (ref 26.6–33)
MCHC RBC AUTO-ENTMCNC: 34.8 G/DL (ref 31.5–35.7)
MCV RBC AUTO: 89.3 FL (ref 79–97)
MONOCYTES # BLD AUTO: 0.59 10*3/MM3 (ref 0.1–0.9)
MONOCYTES NFR BLD AUTO: 9.1 % (ref 5–12)
NEUTROPHILS # BLD AUTO: 3.13 10*3/MM3 (ref 1.7–7)
NEUTROPHILS NFR BLD AUTO: 48.5 % (ref 42.7–76)
NRBC BLD AUTO-RTO: 0 /100 WBC (ref 0–0.2)
PLATELET # BLD AUTO: 258 10*3/MM3 (ref 140–450)
RBC # BLD AUTO: 5.53 10*6/MM3 (ref 4.14–5.8)
REQUEST PROBLEM: NORMAL
TESTOST SERPL-MCNC: NORMAL NG/DL
TSH SERPL DL<=0.005 MIU/L-ACNC: 4.14 UIU/ML (ref 0.27–4.2)
VIT B12 SERPL-MCNC: 632 PG/ML (ref 211–946)
WBC # BLD AUTO: 6.45 10*3/MM3 (ref 3.4–10.8)

## 2025-04-23 DIAGNOSIS — E03.9 HYPOTHYROIDISM (ACQUIRED): ICD-10-CM

## 2025-04-23 DIAGNOSIS — F90.0 ADHD, PREDOMINANTLY INATTENTIVE TYPE: ICD-10-CM

## 2025-04-23 RX ORDER — LEVOTHYROXINE SODIUM 125 UG/1
125 TABLET ORAL DAILY
Qty: 90 TABLET | Refills: 1 | Status: SHIPPED | OUTPATIENT
Start: 2025-04-23

## 2025-04-23 RX ORDER — DEXTROAMPHETAMINE SACCHARATE, AMPHETAMINE ASPARTATE, DEXTROAMPHETAMINE SULFATE AND AMPHETAMINE SULFATE 7.5; 7.5; 7.5; 7.5 MG/1; MG/1; MG/1; MG/1
30 TABLET ORAL 2 TIMES DAILY
Qty: 60 TABLET | Refills: 0 | Status: SHIPPED | OUTPATIENT
Start: 2025-04-23

## 2025-06-02 DIAGNOSIS — E03.9 HYPOTHYROIDISM (ACQUIRED): ICD-10-CM

## 2025-06-02 RX ORDER — LEVOTHYROXINE SODIUM 125 UG/1
125 TABLET ORAL DAILY
Qty: 30 TABLET | Refills: 0 | Status: SHIPPED | OUTPATIENT
Start: 2025-06-02

## 2025-06-10 DIAGNOSIS — E78.1 HYPERTRIGLYCERIDEMIA: Primary | ICD-10-CM

## 2025-06-10 RX ORDER — FENOFIBRATE 160 MG/1
160 TABLET ORAL DAILY
Qty: 90 TABLET | Refills: 1 | Status: SHIPPED | OUTPATIENT
Start: 2025-06-10

## 2025-07-25 DIAGNOSIS — F90.0 ADHD, PREDOMINANTLY INATTENTIVE TYPE: ICD-10-CM

## 2025-07-25 RX ORDER — DEXTROAMPHETAMINE SACCHARATE, AMPHETAMINE ASPARTATE, DEXTROAMPHETAMINE SULFATE AND AMPHETAMINE SULFATE 7.5; 7.5; 7.5; 7.5 MG/1; MG/1; MG/1; MG/1
30 TABLET ORAL 2 TIMES DAILY
Qty: 60 TABLET | Refills: 0 | Status: SHIPPED | OUTPATIENT
Start: 2025-07-25